# Patient Record
Sex: FEMALE | Race: WHITE | NOT HISPANIC OR LATINO | URBAN - METROPOLITAN AREA
[De-identification: names, ages, dates, MRNs, and addresses within clinical notes are randomized per-mention and may not be internally consistent; named-entity substitution may affect disease eponyms.]

---

## 2022-01-19 ENCOUNTER — OFFICE VISIT (OUTPATIENT)
Dept: URBAN - METROPOLITAN AREA CLINIC 70 | Facility: CLINIC | Age: 62
End: 2022-01-19

## 2022-01-26 ENCOUNTER — OFFICE VISIT (OUTPATIENT)
Dept: URBAN - METROPOLITAN AREA CLINIC 70 | Facility: CLINIC | Age: 62
End: 2022-01-26

## 2022-07-14 ENCOUNTER — LAB OUTSIDE AN ENCOUNTER (OUTPATIENT)
Dept: URBAN - METROPOLITAN AREA CLINIC 70 | Facility: CLINIC | Age: 62
End: 2022-07-14

## 2022-07-14 ENCOUNTER — OFFICE VISIT (OUTPATIENT)
Dept: URBAN - METROPOLITAN AREA CLINIC 70 | Facility: CLINIC | Age: 62
End: 2022-07-14
Payer: COMMERCIAL

## 2022-07-14 ENCOUNTER — WEB ENCOUNTER (OUTPATIENT)
Dept: URBAN - METROPOLITAN AREA CLINIC 70 | Facility: CLINIC | Age: 62
End: 2022-07-14

## 2022-07-14 ENCOUNTER — DASHBOARD ENCOUNTERS (OUTPATIENT)
Age: 62
End: 2022-07-14

## 2022-07-14 VITALS
WEIGHT: 162.1 LBS | TEMPERATURE: 97.4 F | HEART RATE: 89 BPM | SYSTOLIC BLOOD PRESSURE: 173 MMHG | BODY MASS INDEX: 28.72 KG/M2 | DIASTOLIC BLOOD PRESSURE: 97 MMHG | HEIGHT: 63 IN

## 2022-07-14 DIAGNOSIS — R10.13 EPIGASTRIC DISCOMFORT: ICD-10-CM

## 2022-07-14 DIAGNOSIS — D50.9 IRON DEFICIENCY ANEMIA, UNSPECIFIED IRON DEFICIENCY ANEMIA TYPE: ICD-10-CM

## 2022-07-14 PROCEDURE — 99204 OFFICE O/P NEW MOD 45 MIN: CPT | Performed by: NURSE PRACTITIONER

## 2022-07-14 RX ORDER — PANTOPRAZOLE SODIUM 40 MG/1
1 TABLET TABLET, DELAYED RELEASE ORAL TWICE A DAY
Qty: 60 TABLET | Refills: 3 | OUTPATIENT
Start: 2022-07-14

## 2022-07-14 NOTE — PHYSICAL EXAM GASTROINTESTINAL
Abdomen , soft, slight ttp in epigastric area overlying site of previous hernia repair with hernia still present, nondistended , no guarding or rigidity , no masses palpable , normal bowel sounds , Liver and Spleen , no hepatomegaly present , liver nontender

## 2022-07-14 NOTE — HPI-TODAY'S VISIT:
Patient is a 63 y/o female who was referred from [facility] for evaluation of STEPHON. A copy of this note will be sent to referring provider. She has a hx of pulmonary AVM requiring resection. Labs on 12/96/21 showed H/H 7.2/27.2, MCV 69, and iron 11. She has received blood and iron transfusions with most recent labs on 6/14/22 showing H/H improved at 9.6/31.3 but she remains iron deficient with iron sat 2.7 and iron 10. There are no active signs of GI bleeding such as hematemesis, melena, or hematochezia. No hemoptysis. Last colonoscopy in 2018 per patient by Dr. Nelson at [facility] with negative results. No Fhx of colon cancer. No prior EGD or hx of PUD. Takes Goody's powder daily. Reports some chronic intermittent upper abdominal discomfort at site of previous abdominal hernia repair which failed. Has a hx of COPD. Is not oxygen dependent. O2 sat 98% on RA with no respiratory distress noted upon exam. Denies CP, SOB, dizziness/lightheadedness, N/V, dysphagia, constipation, or diarrhea.

## 2022-07-19 PROBLEM — 87522002: Status: ACTIVE | Noted: 2022-07-14

## 2022-08-16 ENCOUNTER — OFFICE VISIT (OUTPATIENT)
Dept: URBAN - METROPOLITAN AREA SURGERY CENTER 24 | Facility: SURGERY CENTER | Age: 62
End: 2022-08-16

## 2022-08-30 ENCOUNTER — OFFICE VISIT (OUTPATIENT)
Dept: URBAN - METROPOLITAN AREA CLINIC 70 | Facility: CLINIC | Age: 62
End: 2022-08-30

## 2023-08-23 ENCOUNTER — OFFICE VISIT (OUTPATIENT)
Dept: URBAN - METROPOLITAN AREA CLINIC 70 | Facility: CLINIC | Age: 63
End: 2023-08-23

## 2023-09-13 ENCOUNTER — OFFICE VISIT (OUTPATIENT)
Dept: URBAN - METROPOLITAN AREA CLINIC 70 | Facility: CLINIC | Age: 63
End: 2023-09-13

## 2024-08-27 ENCOUNTER — LAB OUTSIDE AN ENCOUNTER (OUTPATIENT)
Dept: URBAN - METROPOLITAN AREA CLINIC 70 | Facility: CLINIC | Age: 64
End: 2024-08-27

## 2024-08-27 ENCOUNTER — OFFICE VISIT (OUTPATIENT)
Dept: URBAN - METROPOLITAN AREA CLINIC 70 | Facility: CLINIC | Age: 64
End: 2024-08-27
Payer: COMMERCIAL

## 2024-08-27 VITALS
OXYGEN SATURATION: 98 % | HEART RATE: 94 BPM | WEIGHT: 158.4 LBS | BODY MASS INDEX: 28.07 KG/M2 | TEMPERATURE: 97.9 F | HEIGHT: 63 IN | SYSTOLIC BLOOD PRESSURE: 145 MMHG | DIASTOLIC BLOOD PRESSURE: 80 MMHG

## 2024-08-27 DIAGNOSIS — R10.13 EPIGASTRIC DISCOMFORT: ICD-10-CM

## 2024-08-27 DIAGNOSIS — D50.8 ACHLORHYDRIC ANEMIA: ICD-10-CM

## 2024-08-27 PROCEDURE — 99214 OFFICE O/P EST MOD 30 MIN: CPT | Performed by: NURSE PRACTITIONER

## 2024-08-27 RX ORDER — PANTOPRAZOLE SODIUM 40 MG/1
1 TABLET TABLET, DELAYED RELEASE ORAL TWICE A DAY
Qty: 180 TABLET | Refills: 3 | OUTPATIENT

## 2024-08-27 RX ORDER — OMEPRAZOLE AND SODIUM BICARBONATE 40; 1100 MG/1; MG/1
1 CAPSULE ON AN EMPTY STOMACH CAPSULE ORAL ONCE A DAY
Status: ACTIVE | COMMUNITY

## 2024-08-27 RX ORDER — PANTOPRAZOLE SODIUM 40 MG/1
1 TABLET TABLET, DELAYED RELEASE ORAL TWICE A DAY
Qty: 60 TABLET | Refills: 3 | Status: DISCONTINUED | COMMUNITY
Start: 2022-07-14

## 2024-08-27 NOTE — HPI-TODAY'S VISIT:
OV 7/14/22: Patient is a 63 y/o female who was referred from [facility] for evaluation of STEPHON. A copy of this note will be sent to referring provider. She has a hx of pulmonary AVM requiring resection. Labs on 12/96/21 showed H/H 7.2/27.2, MCV 69, and iron 11. She has received blood and iron transfusions with most recent labs on 6/14/22 showing H/H improved at 9.6/31.3 but she remains iron deficient with iron sat 2.7 and iron 10. There are no active signs of GI bleeding such as hematemesis, melena, or hematochezia. No hemoptysis. Last colonoscopy in 2018 per patient by Dr. Nelson at [facility] with negative results. No Fhx of colon cancer. No prior EGD or hx of PUD. Takes Goody's powder daily. Reports some chronic intermittent upper abdominal discomfort at site of previous abdominal hernia repair which failed. Has a hx of COPD. Is not oxygen dependent. O2 sat 98% on RA with no respiratory distress noted upon exam. Denies CP, SOB, dizziness/lightheadedness, N/V, dysphagia, constipation, or diarrhea. - - - - - - - - - - - -  Today 8/27/24: Patient presents today for EGD/colonoscopy. She was scheduled for EGD/colonoscopy at last OV in 2022 but procedures were not completed due to her  having an MI requiring CABG and she has been lost to f/u since that time. Continues to be followed by [facility] with STEPHON. Still takes Goodys occasionaly with chronic epigastric pain w/o change. Denies CP, SOB, wt loss, N/V, dysphagia, constipation, diarrhea, or active signs of GI bleeding.

## 2024-09-24 ENCOUNTER — TELEPHONE ENCOUNTER (OUTPATIENT)
Dept: URBAN - METROPOLITAN AREA CLINIC 70 | Facility: CLINIC | Age: 64
End: 2024-09-24

## 2024-09-26 ENCOUNTER — OFFICE VISIT (OUTPATIENT)
Dept: URBAN - METROPOLITAN AREA SURGERY CENTER 24 | Facility: SURGERY CENTER | Age: 64
End: 2024-09-26

## 2024-09-26 RX ORDER — OMEPRAZOLE AND SODIUM BICARBONATE 40; 1100 MG/1; MG/1
1 CAPSULE ON AN EMPTY STOMACH CAPSULE ORAL ONCE A DAY
Status: ACTIVE | COMMUNITY

## 2024-09-26 RX ORDER — PANTOPRAZOLE SODIUM 40 MG/1
1 TABLET TABLET, DELAYED RELEASE ORAL TWICE A DAY
Qty: 180 TABLET | Refills: 3 | Status: ACTIVE | COMMUNITY

## 2024-10-21 ENCOUNTER — OFFICE VISIT (OUTPATIENT)
Dept: URBAN - METROPOLITAN AREA CLINIC 70 | Facility: CLINIC | Age: 64
End: 2024-10-21